# Patient Record
Sex: MALE | NOT HISPANIC OR LATINO
[De-identification: names, ages, dates, MRNs, and addresses within clinical notes are randomized per-mention and may not be internally consistent; named-entity substitution may affect disease eponyms.]

---

## 2022-03-25 ENCOUNTER — APPOINTMENT (OUTPATIENT)
Dept: ULTRASOUND IMAGING | Facility: CLINIC | Age: 77
End: 2022-03-25
Payer: MEDICARE

## 2022-03-25 PROCEDURE — 76536 US EXAM OF HEAD AND NECK: CPT

## 2022-04-18 ENCOUNTER — APPOINTMENT (OUTPATIENT)
Dept: CT IMAGING | Facility: CLINIC | Age: 77
End: 2022-04-18

## 2022-10-04 ENCOUNTER — APPOINTMENT (OUTPATIENT)
Dept: ULTRASOUND IMAGING | Facility: CLINIC | Age: 77
End: 2022-10-04

## 2022-10-04 PROCEDURE — 76536 US EXAM OF HEAD AND NECK: CPT

## 2022-11-15 ENCOUNTER — OUTPATIENT (OUTPATIENT)
Dept: OUTPATIENT SERVICES | Facility: HOSPITAL | Age: 77
LOS: 1 days | End: 2022-11-15

## 2022-11-15 DIAGNOSIS — C34.2 MALIGNANT NEOPLASM OF MIDDLE LOBE, BRONCHUS OR LUNG: ICD-10-CM

## 2022-11-16 ENCOUNTER — APPOINTMENT (OUTPATIENT)
Age: 77
End: 2022-11-16

## 2022-11-16 VITALS
DIASTOLIC BLOOD PRESSURE: 81 MMHG | TEMPERATURE: 97.3 F | WEIGHT: 191.18 LBS | OXYGEN SATURATION: 97 % | HEART RATE: 96 BPM | HEIGHT: 69 IN | BODY MASS INDEX: 28.32 KG/M2 | SYSTOLIC BLOOD PRESSURE: 132 MMHG

## 2022-11-16 DIAGNOSIS — Z78.9 OTHER SPECIFIED HEALTH STATUS: ICD-10-CM

## 2022-11-16 DIAGNOSIS — Z87.438 PERSONAL HISTORY OF OTHER DISEASES OF MALE GENITAL ORGANS: ICD-10-CM

## 2022-11-16 DIAGNOSIS — Z87.891 PERSONAL HISTORY OF NICOTINE DEPENDENCE: ICD-10-CM

## 2022-11-16 DIAGNOSIS — Z86.79 PERSONAL HISTORY OF OTHER DISEASES OF THE CIRCULATORY SYSTEM: ICD-10-CM

## 2022-11-16 DIAGNOSIS — Z87.09 PERSONAL HISTORY OF OTHER DISEASES OF THE RESPIRATORY SYSTEM: ICD-10-CM

## 2022-11-16 PROCEDURE — 99205 OFFICE O/P NEW HI 60 MIN: CPT

## 2022-11-23 ENCOUNTER — APPOINTMENT (OUTPATIENT)
Dept: MRI IMAGING | Facility: CLINIC | Age: 77
End: 2022-11-23

## 2022-11-23 PROCEDURE — A9585: CPT | Mod: JW

## 2022-11-23 PROCEDURE — 70553 MRI BRAIN STEM W/O & W/DYE: CPT

## 2022-11-29 ENCOUNTER — APPOINTMENT (OUTPATIENT)
Dept: NUCLEAR MEDICINE | Facility: CLINIC | Age: 77
End: 2022-11-29

## 2022-11-29 PROCEDURE — 78815 PET IMAGE W/CT SKULL-THIGH: CPT | Mod: PI

## 2022-11-29 PROCEDURE — A9552: CPT

## 2022-11-30 ENCOUNTER — NON-APPOINTMENT (OUTPATIENT)
Age: 77
End: 2022-11-30

## 2022-12-01 ENCOUNTER — APPOINTMENT (OUTPATIENT)
Age: 77
End: 2022-12-01

## 2022-12-01 VITALS
SYSTOLIC BLOOD PRESSURE: 138 MMHG | WEIGHT: 193 LBS | HEIGHT: 69 IN | TEMPERATURE: 98.1 F | OXYGEN SATURATION: 97 % | BODY MASS INDEX: 28.58 KG/M2 | DIASTOLIC BLOOD PRESSURE: 82 MMHG | HEART RATE: 101 BPM

## 2022-12-01 PROCEDURE — 99213 OFFICE O/P EST LOW 20 MIN: CPT

## 2022-12-05 PROBLEM — Z87.438 HISTORY OF BENIGN PROSTATIC HYPERPLASIA: Status: RESOLVED | Noted: 2022-12-05 | Resolved: 2022-12-05

## 2022-12-05 PROBLEM — Z78.9 RARELY CONSUMES ALCOHOL: Status: ACTIVE | Noted: 2022-12-05

## 2022-12-05 PROBLEM — Z87.891 FORMER SMOKER: Status: ACTIVE | Noted: 2022-12-05

## 2022-12-05 PROBLEM — Z86.79 HISTORY OF PERIPHERAL VASCULAR DISEASE: Status: RESOLVED | Noted: 2022-12-05 | Resolved: 2022-12-05

## 2022-12-05 PROBLEM — Z87.09 HISTORY OF COPD: Status: RESOLVED | Noted: 2022-12-05 | Resolved: 2022-12-05

## 2022-12-05 RX ORDER — TAMSULOSIN HYDROCHLORIDE 0.4 MG/1
CAPSULE ORAL
Refills: 0 | Status: ACTIVE | COMMUNITY

## 2022-12-05 RX ORDER — FLUTICASONE FUROATE, UMECLIDINIUM BROMIDE AND VILANTEROL TRIFENATATE 100; 62.5; 25 UG/1; UG/1; UG/1
100-62.5-25 POWDER RESPIRATORY (INHALATION)
Refills: 0 | Status: ACTIVE | COMMUNITY

## 2022-12-05 RX ORDER — CILOSTAZOL 100 MG/1
TABLET ORAL
Refills: 0 | Status: ACTIVE | COMMUNITY

## 2022-12-05 RX ORDER — ASPIRIN 81 MG
81 TABLET, DELAYED RELEASE (ENTERIC COATED) ORAL
Refills: 0 | Status: ACTIVE | COMMUNITY

## 2022-12-05 NOTE — HISTORY OF PRESENT ILLNESS
[de-identified] : Mr. Gillis has a history of right upper lobe lung cancer status postresection.  Recently in the [facility] emergency room with multiple complaints.  He was found to have a 3 x 3 cm nodular soft tissue density at the resection site on CT of the chest.  Was also noted to have multiple other ipsilateral subcentimeter lung nodules.\par \par There was compression and occlusion of the right upper lobe segmental branch blood vessel secondary to the soft tissue density in the right hilum.  No PE.  Noted to also have multiple prominent subcarinal lymph nodes.  He was noted to have a right adrenal nodule measuring 1 cm with intermediate Hounsfield attenuation. [de-identified] : PET-CT imaging reveals no evidence of recurrence. RUL nodular density with physiologic SUV. Other nodular areas have resolved compared to past CT. MRI brain without evidence of malignancy.\par \par He denies any ongoing fevers or chills, no headache, no lumps or bumps, no weight loss, no bleeding or bruising.\par

## 2022-12-05 NOTE — CONSULT LETTER
[Dear  ___] : Dear ~SHERYL, [Consult Letter:] : I had the pleasure of evaluating your patient, [unfilled]. [Please see my note below.] : Please see my note below. [Consult Closing:] : Thank you very much for allowing me to participate in the care of this patient.  If you have any questions, please do not hesitate to contact me. [Sincerely,] : Sincerely, [FreeTextEntry2] : VISHNU Suarez [FreeTextEntry3] : Kyle Justice MD\par

## 2022-12-05 NOTE — REVIEW OF SYSTEMS
[Shortness Of Breath] : shortness of breath [Wheezing] : wheezing [Cough] : cough [SOB on Exertion] : shortness of breath during exertion [Joint Pain] : joint pain [Difficulty Walking] : difficulty walking

## 2022-12-05 NOTE — CONSULT LETTER
[Dear  ___] : Dear ~SHERYL, [Courtesy Letter:] : I had the pleasure of seeing your patient, [unfilled], in my office today. [Please see my note below.] : Please see my note below. [Sincerely,] : Sincerely, [FreeTextEntry2] : VISHNU Suarez [FreeTextEntry3] : Kyle Justice MD\par

## 2022-12-05 NOTE — PHYSICAL EXAM
[Restricted in physically strenuous activity but ambulatory and able to carry out work of a light or sedentary nature] : Status 1- Restricted in physically strenuous activity but ambulatory and able to carry out work of a light or sedentary nature, e.g., light house work, office work [Normal] : affect appropriate [de-identified] : anicteric [de-identified] : Decreased air entry diffusely

## 2022-12-05 NOTE — PHYSICAL EXAM
[Restricted in physically strenuous activity but ambulatory and able to carry out work of a light or sedentary nature] : Status 1- Restricted in physically strenuous activity but ambulatory and able to carry out work of a light or sedentary nature, e.g., light house work, office work [Normal] : affect appropriate [de-identified] : anicteric [de-identified] : Decreased air entry diffusely

## 2022-12-05 NOTE — RESULTS/DATA
[FreeTextEntry1] : Mr. Gillis is a pleasant 77 year-old man with a history of lung cancer status-post right upper lobe wedge resection, here for evaluation of lung nodules concerning for a recurrence.

## 2022-12-05 NOTE — HISTORY OF PRESENT ILLNESS
Chief Complaint   Patient presents with     RECHECK     return diabetes      Esperanza Bañuelos CMA   [de-identified] : Mr. Gillis has a history of right upper lobe lung cancer status postresection.  Recently in the [facility] emergency room with multiple complaints.  He was found to have a 3 x 3 cm nodular soft tissue density at the resection site on CT of the chest.  Was also noted to have multiple other ipsilateral subcentimeter lung nodules.\par \par There was compression and occlusion of the right upper lobe segmental branch blood vessel secondary to the soft tissue density in the right hilum.  No PE.  Noted to also have multiple prominent subcarinal lymph nodes.  He was noted to have a right adrenal nodule measuring 1 cm with intermediate Hounsfield attenuation.

## 2022-12-05 NOTE — REVIEW OF SYSTEMS
[Patient Intake Form Reviewed] : Patient intake form was reviewed [Fatigue] : fatigue [Shortness Of Breath] : shortness of breath [Wheezing] : wheezing [Cough] : cough [SOB on Exertion] : shortness of breath during exertion [Joint Pain] : joint pain [Difficulty Walking] : difficulty walking

## 2023-05-29 ENCOUNTER — OUTPATIENT (OUTPATIENT)
Dept: OUTPATIENT SERVICES | Facility: HOSPITAL | Age: 78
LOS: 1 days | End: 2023-05-29

## 2023-05-29 DIAGNOSIS — C34.2 MALIGNANT NEOPLASM OF MIDDLE LOBE, BRONCHUS OR LUNG: ICD-10-CM

## 2023-06-02 ENCOUNTER — APPOINTMENT (OUTPATIENT)
Age: 78
End: 2023-06-02
Payer: COMMERCIAL

## 2023-06-02 VITALS
HEIGHT: 69 IN | BODY MASS INDEX: 28.44 KG/M2 | HEART RATE: 111 BPM | OXYGEN SATURATION: 96 % | DIASTOLIC BLOOD PRESSURE: 74 MMHG | TEMPERATURE: 98.2 F | SYSTOLIC BLOOD PRESSURE: 134 MMHG | WEIGHT: 192 LBS

## 2023-06-02 PROCEDURE — 99213 OFFICE O/P EST LOW 20 MIN: CPT

## 2023-07-21 NOTE — HISTORY OF PRESENT ILLNESS
[de-identified] : Mr. Gillis has a history of right upper lobe lung cancer status postresection.  Recently in the [facility] emergency room with multiple complaints.  He was found to have a 3 x 3 cm nodular soft tissue density at the resection site on CT of the chest.  Was also noted to have multiple other ipsilateral subcentimeter lung nodules.\par \par There was compression and occlusion of the right upper lobe segmental branch blood vessel secondary to the soft tissue density in the right hilum.  No PE.  Noted to also have multiple prominent subcarinal lymph nodes.  He was noted to have a right adrenal nodule measuring 1 cm with intermediate Hounsfield attenuation.\par \par 12/2022 - PET-CT imaging reveals no evidence of recurrence. RUL nodular density with physiologic SUV. Other nodular areas have resolved compared to past CT. MRI brain without evidence of malignancy.\par  [de-identified] : He denies any ongoing fevers or chills, no headache, no lumps or bumps, no weight loss, no bleeding or bruising. Feels well.\par

## 2023-07-21 NOTE — HISTORY OF PRESENT ILLNESS
[de-identified] : Mr. Gillis has a history of right upper lobe lung cancer status postresection.  Recently in the [facility] emergency room with multiple complaints.  He was found to have a 3 x 3 cm nodular soft tissue density at the resection site on CT of the chest.  Was also noted to have multiple other ipsilateral subcentimeter lung nodules.\par \par There was compression and occlusion of the right upper lobe segmental branch blood vessel secondary to the soft tissue density in the right hilum.  No PE.  Noted to also have multiple prominent subcarinal lymph nodes.  He was noted to have a right adrenal nodule measuring 1 cm with intermediate Hounsfield attenuation.\par \par 12/2022 - PET-CT imaging reveals no evidence of recurrence. RUL nodular density with physiologic SUV. Other nodular areas have resolved compared to past CT. MRI brain without evidence of malignancy.\par  [de-identified] : He denies any ongoing fevers or chills, no headache, no lumps or bumps, no weight loss, no bleeding or bruising. Feels well.\par

## 2023-07-21 NOTE — PHYSICAL EXAM
[Restricted in physically strenuous activity but ambulatory and able to carry out work of a light or sedentary nature] : Status 1- Restricted in physically strenuous activity but ambulatory and able to carry out work of a light or sedentary nature, e.g., light house work, office work [Normal] : affect appropriate [de-identified] : anicteric [de-identified] : Decreased air entry diffusely

## 2023-07-21 NOTE — RESULTS/DATA
[FreeTextEntry1] : Mr. Gillis is a pleasant 78 year-old man with a history of lung cancer status-post right upper lobe wedge resection, here for evaluation of lung nodules concerning for a recurrence.

## 2023-07-21 NOTE — PHYSICAL EXAM
[Restricted in physically strenuous activity but ambulatory and able to carry out work of a light or sedentary nature] : Status 1- Restricted in physically strenuous activity but ambulatory and able to carry out work of a light or sedentary nature, e.g., light house work, office work [Normal] : affect appropriate [de-identified] : anicteric [de-identified] : Decreased air entry diffusely

## 2023-08-02 ENCOUNTER — APPOINTMENT (OUTPATIENT)
Dept: NUCLEAR MEDICINE | Facility: CLINIC | Age: 78
End: 2023-08-02
Payer: COMMERCIAL

## 2023-08-02 PROCEDURE — A9552: CPT

## 2023-08-02 PROCEDURE — 78815 PET IMAGE W/CT SKULL-THIGH: CPT | Mod: PS

## 2023-11-28 ENCOUNTER — OUTPATIENT (OUTPATIENT)
Dept: OUTPATIENT SERVICES | Facility: HOSPITAL | Age: 78
LOS: 1 days | End: 2023-11-28

## 2023-11-28 DIAGNOSIS — C34.2 MALIGNANT NEOPLASM OF MIDDLE LOBE, BRONCHUS OR LUNG: ICD-10-CM

## 2023-12-04 ENCOUNTER — APPOINTMENT (OUTPATIENT)
Age: 78
End: 2023-12-04
Payer: COMMERCIAL

## 2023-12-04 VITALS
TEMPERATURE: 98.2 F | DIASTOLIC BLOOD PRESSURE: 74 MMHG | BODY MASS INDEX: 29.18 KG/M2 | OXYGEN SATURATION: 97 % | SYSTOLIC BLOOD PRESSURE: 153 MMHG | WEIGHT: 197 LBS | HEIGHT: 69 IN | HEART RATE: 108 BPM

## 2023-12-04 DIAGNOSIS — C34.91 MALIGNANT NEOPLASM OF UNSPECIFIED PART OF RIGHT BRONCHUS OR LUNG: ICD-10-CM

## 2023-12-04 PROCEDURE — 99213 OFFICE O/P EST LOW 20 MIN: CPT

## 2023-12-04 RX ORDER — TESTOSTERONE 30 MG/1.5ML
SOLUTION TOPICAL
Refills: 0 | Status: ACTIVE | COMMUNITY

## 2024-06-05 ENCOUNTER — OUTPATIENT (OUTPATIENT)
Dept: OUTPATIENT SERVICES | Facility: HOSPITAL | Age: 79
LOS: 1 days | End: 2024-06-05

## 2024-06-05 DIAGNOSIS — C34.2 MALIGNANT NEOPLASM OF MIDDLE LOBE, BRONCHUS OR LUNG: ICD-10-CM

## 2024-07-10 ENCOUNTER — APPOINTMENT (OUTPATIENT)
Dept: CT IMAGING | Facility: CLINIC | Age: 79
End: 2024-07-10
Payer: MEDICARE

## 2024-07-10 ENCOUNTER — RESULT REVIEW (OUTPATIENT)
Age: 79
End: 2024-07-10

## 2024-07-10 PROCEDURE — 74177 CT ABD & PELVIS W/CONTRAST: CPT | Mod: MH

## 2024-07-10 PROCEDURE — 71260 CT THORAX DX C+: CPT

## 2024-07-22 ENCOUNTER — APPOINTMENT (OUTPATIENT)
Dept: HEMATOLOGY ONCOLOGY | Facility: CLINIC | Age: 79
End: 2024-07-22
Payer: COMMERCIAL

## 2024-07-22 VITALS
BODY MASS INDEX: 28.06 KG/M2 | HEART RATE: 101 BPM | DIASTOLIC BLOOD PRESSURE: 71 MMHG | TEMPERATURE: 98.2 F | SYSTOLIC BLOOD PRESSURE: 118 MMHG | OXYGEN SATURATION: 95 % | WEIGHT: 190 LBS

## 2024-07-22 DIAGNOSIS — C34.91 MALIGNANT NEOPLASM OF UNSPECIFIED PART OF RIGHT BRONCHUS OR LUNG: ICD-10-CM

## 2024-07-22 PROCEDURE — 99213 OFFICE O/P EST LOW 20 MIN: CPT

## 2024-07-22 PROCEDURE — G2211 COMPLEX E/M VISIT ADD ON: CPT

## 2024-07-22 NOTE — REVIEW OF SYSTEMS
[Shortness Of Breath] : shortness of breath [Wheezing] : wheezing [Cough] : cough [SOB on Exertion] : shortness of breath during exertion [Diarrhea: Grade 0] : Diarrhea: Grade 0 [Joint Pain] : joint pain [Joint Stiffness] : joint stiffness [Difficulty Walking] : difficulty walking [Negative] : Allergic/Immunologic [Easy Bleeding] : no tendency for easy bleeding [Easy Bruising] : no tendency for easy bruising [Swollen Glands] : no swollen glands [FreeTextEntry6] : sx in settings of COPD

## 2024-07-22 NOTE — RESULTS/DATA
[FreeTextEntry1] : Mr. Gillis is a pleasant 79 year-old man with a history of lung cancer status-post right upper lobe wedge resection, here in follow-up

## 2024-07-22 NOTE — ADDENDUM
[FreeTextEntry1] : This note was written by REY CHAIDEZ on 07/22/2024 , acting solely as a scribe for Dr. Kyle Justice MD.   All medical record entries made by the scribe, REY CHAIDEZ, were at my, Dr. Kyle Justice MD, direction and personally dictated by me during today's encounter. I have personally reviewed the chart and agree that the record accurately reflects my personal performance and care.

## 2024-07-22 NOTE — PHYSICAL EXAM
[Restricted in physically strenuous activity but ambulatory and able to carry out work of a light or sedentary nature] : Status 1- Restricted in physically strenuous activity but ambulatory and able to carry out work of a light or sedentary nature, e.g., light house work, office work [Normal] : affect appropriate [de-identified] : Currently ambulating with assistive cane [de-identified] : anicteric [de-identified] : Decreased air entry diffusely

## 2024-07-22 NOTE — HISTORY OF PRESENT ILLNESS
[de-identified] : Mr. Gillis has a history of right upper lobe lung cancer status postresection.  Recently in the [facility] emergency room with multiple complaints.  He was found to have a 3 x 3 cm nodular soft tissue density at the resection site on CT of the chest.  Was also noted to have multiple other ipsilateral subcentimeter lung nodules.  There was compression and occlusion of the right upper lobe segmental branch blood vessel secondary to the soft tissue density in the right hilum.  No PE.  Noted to also have multiple prominent subcarinal lymph nodes.  He was noted to have a right adrenal nodule measuring 1 cm with intermediate Hounsfield attenuation.  12/2022 - PET-CT imaging reveals no evidence of recurrence. RUL nodular density with physiologic SUV. Other nodular areas have resolved compared to past CT. MRI brain without evidence of malignancy [de-identified] : Presents for follow up. Notes that he followed up with his neurologist about 3 months ago when he was started on Parkinson's medication. Now ambulating with a cane for assistance with walking. He has been taking the medication's as prescribed but denotes he has not seen much improvement in symptoms. Reportedly does not have follow up scheduled.  Patient recently obtained Chest CT for continued surveillance of lung cancer status post Right upper lobectomy. Reports history of COPD, but this is being managed by his PCP.  CT C/A/P 7/10/24 IMPRESSION: Stable examination.  Right upper lobectomy changes with calcification and soft tissue density at the right hilum unchanged compared to prior examination 8/2/2023.  No evidence of metastatic disease in the abdomen, or pelvis.   Following closely with urology for BPH, also started testosterone injections for low T. Noting some sciatic right lower back pain, responds to motrin. Taking 600 MG Motrin 1x day. Does take occasional breaks from Motrin. Also taking Aspirin.

## 2025-07-11 ENCOUNTER — OUTPATIENT (OUTPATIENT)
Dept: OUTPATIENT SERVICES | Facility: HOSPITAL | Age: 80
LOS: 1 days | End: 2025-07-11

## 2025-07-11 DIAGNOSIS — C34.2 MALIGNANT NEOPLASM OF MIDDLE LOBE, BRONCHUS OR LUNG: ICD-10-CM

## 2025-07-14 ENCOUNTER — APPOINTMENT (OUTPATIENT)
Dept: HEMATOLOGY ONCOLOGY | Facility: CLINIC | Age: 80
End: 2025-07-14

## 2025-09-10 ENCOUNTER — APPOINTMENT (OUTPATIENT)
Dept: UROLOGY | Facility: CLINIC | Age: 80
End: 2025-09-10